# Patient Record
Sex: FEMALE | Race: WHITE | URBAN - METROPOLITAN AREA
[De-identification: names, ages, dates, MRNs, and addresses within clinical notes are randomized per-mention and may not be internally consistent; named-entity substitution may affect disease eponyms.]

---

## 2019-05-20 ENCOUNTER — HISTORICAL (OUTPATIENT)
Dept: ADMINISTRATIVE | Facility: HOSPITAL | Age: 59
End: 2019-05-20

## 2022-04-11 ENCOUNTER — HISTORICAL (OUTPATIENT)
Dept: ADMINISTRATIVE | Facility: HOSPITAL | Age: 62
End: 2022-04-11

## 2022-04-24 VITALS
WEIGHT: 174.81 LBS | BODY MASS INDEX: 29.12 KG/M2 | DIASTOLIC BLOOD PRESSURE: 66 MMHG | OXYGEN SATURATION: 99 % | HEIGHT: 65 IN | SYSTOLIC BLOOD PRESSURE: 138 MMHG

## 2022-05-05 NOTE — HISTORICAL [facility]
This is a historical note converted from [employer]. Formatting and pictures may have been removed.  Please reference [employer] for original formatting and attached multimedia. Chief Complaint  Pt is here for right hand swelling, numbness and pain. Pt stated the pain starts in her hand and travels to her elbow. Pt stated the pain has been going on for 4 months.  History of Present Illness  58 year old female presents today for evaluation of her right wrist/hand pain.? SHe has numbness and tingling through the thumb, index and middle fingers.? This is pretty consistent and worse with activity.? Denies any neck or shoulder pain.  Review of Systems  Systemic: No fever, no chills, and no recent weight change.  Head: No headache - frequent.  Eyes: No vision problems.  Otolarnygeal: No hearing loss, no earache, no epistaxis, no hoarseness, and no tooth pain. Gums normal.  Cardiovascular: No chest pain or discomfort and no palpitations.  Pulmonary: No pulmonary symptoms - no dyspnea, no shortness of breath  Gastrointestinal: Appetite not decreased. No dysphagia and no constant eructation. No nausea, no vomiting, no abdominal pain, no hematochezia.  Genitourinary: No genitourinary symptoms - No urinary hesitancy. No urinary loss of control - no burning sensation during urination.  Musculoskeletal: No calf muscle cramps and no localized soft tissue swelling  Neurological: No fainting and no convulsions.  Psychological: no depression.  Skin: No rash.  Physical Exam  Vitals & Measurements  T:?98.1? ?F (Oral)? HR:?66(Peripheral)? BP:?138/66?  HT:?164?cm? WT:?79.3?kg? BMI:?29.48?  right hand exam  no swelling, erythema, increased heat  no wrist tenderness  positive carpal compression test  positive phalens test  Full pain free wrist and digital ROM  no thenar atrophy  no sensory deficits  radial pulses 2+  ?  right hand xrays today show no osteoarticular abnormalities  Assessment/Plan  1.?Right carpal tunnel syndrome?G56.01  ?  Discussed surgical versus nonsurgical treatment options with the patient today.? Conservative measures would consist of periodic cortisone injections,?carpal tunnel bracing?and activity modification. ?Definitive treatment would consist of right carpal tunnel release. ?She would like to try conservative measures first.? We will give her a prescription for a carpal tunnel brace?we have offered her cortisone injection but she wants to think about this. ?She will follow-up with us as needed.  Ordered:  External Referral, DME, carpal tunnel brace, 05/20/19 11:32:00 CDT, Right carpal tunnel syndrome  ?  Orders:  Clinic Follow-up PRN, 05/20/19 11:32:00 CDT, Future Order, [facility]  Referrals  External Referral, DME, carpal tunnel brace, 05/20/19 11:32:00 CDT, Right carpal tunnel syndrome  Clinic Follow-up PRN, 05/20/19 11:32:00 CDT, Future Order, [facility]   Problem List/Past Medical History  Ongoing  Arthritis  Degenerative joint disease  Right carpal tunnel syndrome  Historical  Pain in wrist  Procedure/Surgical History  Dequervains (Left) (02/04/2016)  Excision of ganglion, wrist (dorsal or volar); primary (02/04/2016)  Excision of Left Lower Arm and Wrist Tendon, Open Approach (02/04/2016)  Incision, extensor tendon sheath, wrist (eg, deQuervains disease) (02/04/2016)  Release Left Lower Arm and Wrist Tendon, Open Approach (02/04/2016)  Colonoscopy (09/11/2015)  Colonoscopy (09/11/2015)  COLORECTAL CANCER SCREENING; COLONOSCOPY ON INDIVIDUAL NOT MEETING CRITERIA FOR HIGH RISK (09/11/2015)  Hip bearing surface, ceramic-on- polyethylene (06/02/2015)  LEO ROWLEY Total Hip Arthroplasty (Right) (06/02/2015)  Open robotic assisted procedure (06/02/2015)  Total hip replacement (06/02/2015)  LASIK (2004)  Hysterectomy (2001)  right hip surgery (1990)  Appendectomy  Dental implant retained prosthesis  eye surgery-laser  Tubal ligation   Medications  Multiple Vitamins oral tab, 1 tab(s), Oral, Daily  Vitamin D3 5000 intl  units oral tablet, 5000 IntUnit= 1 tab(s), Oral, Daily  Allergies  Percocet 10/325?(Vomiting)  Social History  Alcohol - Low Risk, 05/07/2015  Current, Wine, Liquor, 09/06/2015  Employment/School  Employed, Work/School description: [employer]. Highest education level: High school., 09/06/2015  Home/Environment  Lives with Spouse. Living situation: Home/Independent. Family/Friends available for support: Yes., 09/06/2015  Nutrition/Health  Regular, 09/06/2015  Substance Abuse - Denies Substance Abuse, 05/07/2015  Never, 04/02/2019  Tobacco - Denies Tobacco Use, 05/07/2015  Never (less than 100 in lifetime), N/A, 05/20/2019  Family History  Alcoholism.: Father, Sister and Brother.  Glaucoma.: Mother.  Hyperlipidemia.: Mother and Sister.  Hypertension.: Mother, Father and Sister.  Pancreatic cancer: Father.  Primary malignant neoplasm of prostate: Father.  Rheumatoid arthritis: Brother.  Health Maintenance  Health Maintenance  ???Pending?(in the next year)  ??? ??OverDue  ??? ? ? ?Aspirin Therapy for CVD Prevention due??06/05/16??and every 1??year(s)  ??? ? ? ?Depression Screening due??06/29/17??and every 1??year(s)  ??? ? ? ?Alcohol Misuse Screening due??01/01/19??and every 1??year(s)  ??? ??Due?  ??? ? ? ?ADL Screening due??05/20/19??and every 1??year(s)  ??? ? ? ?Breast Cancer Screening due??05/20/19??and every?  ??? ? ? ?Diabetes Screening due??05/20/19??and every?  ??? ? ? ?Lipid Screening due??05/20/19??and every?  ??? ? ? ?Tetanus Vaccine due??05/20/19??and every 10??year(s)  ??? ??Due In Future?  ??? ? ? ?Obesity Screening not due until??01/01/20??and every 1??year(s)  ??? ? ? ?Blood Pressure Screening not due until??04/01/20??and every 1??year(s)  ??? ? ? ?Body Mass Index Check not due until??04/01/20??and every 1??year(s)  ???Satisfied?(in the past 1 year)  ??? ??Satisfied?  ??? ? ? ?Blood Pressure Screening on??05/20/19.??Satisfied by Lia Dawson LPN  ??? ? ? ?Body Mass Index Check  on??05/20/19.??Satisfied by Lia Dawson LPN  ??? ? ? ?Obesity Screening on??05/20/19.??Satisfied by Lia Dawson LPN  ?  ?

## 2025-01-29 ENCOUNTER — HOSPITAL ENCOUNTER (OUTPATIENT)
Dept: RADIOLOGY | Facility: CLINIC | Age: 65
Discharge: HOME | End: 2025-01-29
Attending: SPECIALIST
Payer: COMMERCIAL

## 2025-01-29 ENCOUNTER — OFFICE VISIT (OUTPATIENT)
Dept: ORTHOPEDICS | Facility: CLINIC | Age: 65
End: 2025-01-29
Payer: COMMERCIAL

## 2025-01-29 VITALS
HEIGHT: 65 IN | HEART RATE: 69 BPM | SYSTOLIC BLOOD PRESSURE: 125 MMHG | WEIGHT: 172 LBS | DIASTOLIC BLOOD PRESSURE: 85 MMHG | BODY MASS INDEX: 28.66 KG/M2

## 2025-01-29 DIAGNOSIS — M25.551 BILATERAL HIP PAIN: ICD-10-CM

## 2025-01-29 DIAGNOSIS — Z96.641 HISTORY OF TOTAL HIP REPLACEMENT, RIGHT: ICD-10-CM

## 2025-01-29 DIAGNOSIS — M25.552 BILATERAL HIP PAIN: ICD-10-CM

## 2025-01-29 DIAGNOSIS — K40.90 LEFT INGUINAL HERNIA: Primary | ICD-10-CM

## 2025-01-29 PROCEDURE — 3079F DIAST BP 80-89 MM HG: CPT | Mod: CPTII,,, | Performed by: SPECIALIST

## 2025-01-29 PROCEDURE — 99213 OFFICE O/P EST LOW 20 MIN: CPT | Mod: ,,, | Performed by: SPECIALIST

## 2025-01-29 PROCEDURE — 1159F MED LIST DOCD IN RCRD: CPT | Mod: CPTII,,, | Performed by: SPECIALIST

## 2025-01-29 PROCEDURE — 3074F SYST BP LT 130 MM HG: CPT | Mod: CPTII,,, | Performed by: SPECIALIST

## 2025-01-29 PROCEDURE — 3008F BODY MASS INDEX DOCD: CPT | Mod: CPTII,,, | Performed by: SPECIALIST

## 2025-01-29 PROCEDURE — 73521 X-RAY EXAM HIPS BI 2 VIEWS: CPT | Mod: ,,, | Performed by: SPECIALIST

## 2025-01-29 RX ORDER — ESTRADIOL 0.1 MG/D
1 FILM, EXTENDED RELEASE TRANSDERMAL
COMMUNITY
Start: 2024-12-29

## 2025-01-29 RX ORDER — ACETAMINOPHEN 500 MG
5000 TABLET ORAL DAILY
COMMUNITY

## 2025-01-29 RX ORDER — MULTIVITAMIN
1 TABLET ORAL DAILY
COMMUNITY

## 2025-01-29 NOTE — PROGRESS NOTES
History reviewed. No pertinent past medical history.    Past Surgical History:   Procedure Laterality Date    ANTERIOR VAGINAL REPAIR      APPENDECTOMY      dental implants      HIP SURGERY Right     10 years ago    PARTIAL HYSTERECTOMY      cervix removed    WRIST SURGERY Left        Current Outpatient Medications   Medication Sig    cholecalciferol, vitamin D3, (VITAMIN D3) 125 mcg (5,000 unit) Tab Take 5,000 Units by mouth once daily.    cyanocobalamin, vitamin B-12, (VITAMIN B-12) 50 mcg tablet Take 50 mcg by mouth once daily.    estradioL (VIVELLE-DOT) 0.1 mg/24 hr PTSW 1 patch twice a week.    multivitamin (ONE DAILY MULTIVITAMIN) per tablet Take 1 tablet by mouth once daily.     No current facility-administered medications for this visit.       Review of patient's allergies indicates:   Allergen Reactions    Oxycodone-acetaminophen Nausea And Vomiting       No family history on file.    Social History     Socioeconomic History    Marital status:    Tobacco Use    Smoking status: Never    Smokeless tobacco: Never   Substance and Sexual Activity    Alcohol use: Yes     Comment: occassional    Drug use: Never    Sexual activity: Yes     Partners: Male       Chief Complaint:   Chief Complaint   Patient presents with    Left Hip - Pain     Garett Hip pain, pain comes and goes, when it starts it last 2 weeks, patient can pinpoint the spot on the left    Right Hip - Pain     Right hip relplacement with Dr. Sands 10 years ago, patient wants to check it out and feels like there are some achy days        Consulting Physician: No ref. provider found    History of present illness:    This is a 64 y.o. year old female who complains of left groin pain.  No history of prior left sided abdominal or hip surgery.  Ten years postop from right total hip arthroplasty with no pain in the right hip.  She was having some soreness in the right hip laterally but this has resolved.  When she points to the area of pain it is  "directly on the anterior superior iliac spine of the left pelvis and just medial to that over the inguinal ligament.  No weight-bearing pain.    Review of Systems:    Constitution:   Denies chills, fever, and sweats.  HENT:   Denies headaches or blurry vision.  Cardiovascular:  Denies chest pain or irregular heart beat.  Respiratory:   Denies cough or shortness of breath.  Gastrointestinal:  Denies abdominal pain, nausea, or vomiting.  Musculoskeletal:   Denies muscle cramps.  Neurological:   Denies dizziness or focal weakness.  Psychiatric/Behavior: Normal mental status.  Hematology/Lymph:  Denies bleeding problem or easy bruising/bleeding.  Skin:    Denies rash or suspicious lesions.    Examination:    Vital Signs:    Vitals:    01/29/25 1020   BP: 125/85   Pulse: 69   Weight: 78 kg (172 lb)   Height: 5' 4.57" (1.64 m)   PainSc: 0-No pain   PainLoc: Hip       Body mass index is 29 kg/m².    Constitution:   Well-developed, well nourished patient in no acute distress.  Neurological:   Alert and oriented x 3 and cooperative to examination.     Psychiatric/Behavior: Normal mental status.  Respiratory:   No shortness of breath.non labored breathing.  Cardiovascular: Regular rate and rhythm  Eyes:    Extraoccular muscles intact  Skin:    No scars, rash or suspicious lesions.    Physical Exam:  Abdominal exam:   No rebound tenderness   No distention   No redness, no swelling, no increased heat   Palpable tenderness left anterior superior iliac spine and inguinal ligament   When she stands symmetrically and coughs there is a painful small bulge just medial to the ASIS and inferior to the inguinal ligament consistent with an inguinal hernia.    Right Hip     No swelling, induration or tenderness    No increased heat    No tenderness    Well healed scar    No instability of the hip was noted. Motion was normal.     No abductor weakness    No weakness was observed.    Sensation intact distally    Intact pedal " pulses    Normal motor function    Normal gait    Complete right hip x-ray with two or more views of the AP and lateral aspects were performed.     Impressions Radiology Test   X-ray of hip was performed showed intact hip prosthesis without signs of loosening or subsidence.    Imaging: X-rays ordered and images interpreted today personally by me of AP pelvis and AP lateral of the right hip which show pristine interfaces and stable well-aligned right total hip arthroplasty.  Left hip has normal cartilage space with no evidence of fracture or dislocation.  No degenerative changes in the left hip.  Impression: Normal left hip joint and stable well-aligned right total hip arthroplasty.         Assessment: Left inguinal hernia  -     Ambulatory referral/consult to General Surgery; Future; Expected date: 02/05/2025    Bilateral hip pain  -     X-Ray Hips Bilateral 2 View Inc AP Pelvis; Future; Expected date: 01/29/2025    History of total hip replacement, right        Plan:  Refer to Dr. Martinez for evaluation of possible left inguinal hernia      DISCLAIMER: This note may have been dictated using voice recognition software and may contain grammatical errors.     NOTE: Consult report sent to referring provider via [employer].